# Patient Record
Sex: MALE | Race: WHITE | Employment: OTHER | URBAN - METROPOLITAN AREA
[De-identification: names, ages, dates, MRNs, and addresses within clinical notes are randomized per-mention and may not be internally consistent; named-entity substitution may affect disease eponyms.]

---

## 2017-06-16 ENCOUNTER — OFFICE VISIT (OUTPATIENT)
Dept: OPTOMETRY | Facility: CLINIC | Age: 42
End: 2017-06-16

## 2017-06-16 DIAGNOSIS — H52.03 HYPEROPIA, BILATERAL: Primary | ICD-10-CM

## 2017-06-16 PROBLEM — H52.00 HYPEROPIA: Status: ACTIVE | Noted: 2017-06-16

## 2017-06-16 PROCEDURE — 92002 INTRM OPH EXAM NEW PATIENT: CPT | Performed by: OPTOMETRIST

## 2017-06-16 PROCEDURE — 92015 DETERMINE REFRACTIVE STATE: CPT | Performed by: OPTOMETRIST

## 2017-06-16 NOTE — PROGRESS NOTES
Charlotte De La Fuente is a 36year old male. HPI:     HPI     Patient is in for an annual eye exam. Notes that he needs his glasses more often when he reads smaller print. Nakul Garzon He was examined about two years ago and told that he can use the glasses for distance Visual Fields      Left Right   Result Full Full         Extraocular Movement      Right Left   Result Full, Ortho Full, Ortho         Neuro/Psych     Oriented x3:  Yes    Mood/Affect:  Normal      Dilation     Both eyes:  1.0% Mydriacyl @ 10:12 AM

## 2017-06-16 NOTE — PATIENT INSTRUCTIONS
Hyperopia  I gave copy of reading RX but patient can just as well use +1.25 OTC reading glasses as needed.

## (undated) NOTE — MR AVS SNAPSHOT
[place]  [address]  [phone]               Thank you for choosing us for your health care visit with [facility].   We are glad to serve you and happy to provide you with this summary of Visits < Visit Summaries. MyChart questions? Call [phone] for help. [email or web address] is NOT to be used for urgent needs. For medical emergencies, dial 911.         Educational Information     Healthy Diet and Regular Exercise  [facility] [employer] [facility]